# Patient Record
Sex: FEMALE | Race: WHITE | Employment: OTHER | URBAN - METROPOLITAN AREA
[De-identification: names, ages, dates, MRNs, and addresses within clinical notes are randomized per-mention and may not be internally consistent; named-entity substitution may affect disease eponyms.]

---

## 2017-05-24 ENCOUNTER — HOSPITAL ENCOUNTER (OUTPATIENT)
Dept: CT IMAGING | Age: 72
Discharge: HOME | End: 2017-05-24
Attending: INTERNAL MEDICINE
Payer: MEDICARE

## 2017-05-24 DIAGNOSIS — R93.89 ABNORMAL CXR (CHEST X-RAY): ICD-10-CM

## 2017-05-24 DIAGNOSIS — R91.8 LUNG MASS: ICD-10-CM

## 2017-05-24 PROCEDURE — 74011636320 HC RX REV CODE- 636/320: Performed by: RADIOLOGY

## 2017-05-24 PROCEDURE — 71260 CT THORAX DX C+: CPT

## 2017-05-24 RX ADMIN — IOPAMIDOL 80 ML: 612 INJECTION, SOLUTION INTRAVENOUS at 15:06

## 2018-03-07 ENCOUNTER — HOSPITAL ENCOUNTER (OUTPATIENT)
Dept: CT IMAGING | Age: 73
Discharge: HOME | End: 2018-03-07
Attending: INTERNAL MEDICINE
Payer: MEDICARE

## 2018-03-07 DIAGNOSIS — R91.1 LUNG NODULE: ICD-10-CM

## 2018-03-07 PROCEDURE — 71250 CT THORAX DX C-: CPT

## 2018-03-15 RX ORDER — SODIUM CHLORIDE 9 MG/ML
20 INJECTION, SOLUTION INTRAVENOUS CONTINUOUS
Status: CANCELLED | OUTPATIENT
Start: 2018-03-15

## 2018-03-19 ENCOUNTER — HOSPITAL ENCOUNTER (OUTPATIENT)
Dept: NUCLEAR MEDICINE | Age: 73
Discharge: HOME | End: 2018-03-19
Attending: INTERNAL MEDICINE
Payer: MEDICARE

## 2018-03-19 DIAGNOSIS — C50.911 MALIGNANT NEOPLASM OF RIGHT BREAST (HCC): ICD-10-CM

## 2018-03-19 PROCEDURE — 78306 BONE IMAGING WHOLE BODY: CPT

## 2018-03-20 ENCOUNTER — HOSPITAL ENCOUNTER (OUTPATIENT)
Dept: CT IMAGING | Age: 73
Discharge: HOME | End: 2018-03-20
Attending: RADIOLOGY | Admitting: RADIOLOGY
Payer: MEDICARE

## 2018-03-20 VITALS
HEIGHT: 62 IN | HEART RATE: 69 BPM | WEIGHT: 165 LBS | RESPIRATION RATE: 18 BRPM | BODY MASS INDEX: 30.36 KG/M2 | DIASTOLIC BLOOD PRESSURE: 64 MMHG | TEMPERATURE: 99 F | OXYGEN SATURATION: 99 % | SYSTOLIC BLOOD PRESSURE: 111 MMHG

## 2018-03-20 DIAGNOSIS — C50.911 MALIGNANT NEOPLASM OF RIGHT BREAST (HCC): ICD-10-CM

## 2018-03-20 LAB
ANION GAP SERPL CALC-SCNC: 8 MMOL/L (ref 3–18)
APTT PPP: 27.6 SEC (ref 23–36.4)
BUN SERPL-MCNC: 11 MG/DL (ref 7–18)
BUN/CREAT SERPL: 19 (ref 12–20)
CALCIUM SERPL-MCNC: 9 MG/DL (ref 8.5–10.1)
CHLORIDE SERPL-SCNC: 108 MMOL/L (ref 100–108)
CO2 SERPL-SCNC: 23 MMOL/L (ref 21–32)
CREAT SERPL-MCNC: 0.58 MG/DL (ref 0.6–1.3)
ERYTHROCYTE [DISTWIDTH] IN BLOOD BY AUTOMATED COUNT: 13.8 % (ref 11.6–14.5)
GLUCOSE SERPL-MCNC: 98 MG/DL (ref 74–99)
HCT VFR BLD AUTO: 44.8 % (ref 35–45)
HGB BLD-MCNC: 15.3 G/DL (ref 12–16)
INR PPP: 1 (ref 0.8–1.2)
MCH RBC QN AUTO: 31.6 PG (ref 24–34)
MCHC RBC AUTO-ENTMCNC: 34.2 G/DL (ref 31–37)
MCV RBC AUTO: 92.6 FL (ref 74–97)
PLATELET # BLD AUTO: 222 K/UL (ref 135–420)
PMV BLD AUTO: 9.3 FL (ref 9.2–11.8)
POTASSIUM SERPL-SCNC: 4.3 MMOL/L (ref 3.5–5.5)
PROTHROMBIN TIME: 12.7 SEC (ref 11.5–15.2)
RBC # BLD AUTO: 4.84 M/UL (ref 4.2–5.3)
SODIUM SERPL-SCNC: 139 MMOL/L (ref 136–145)
WBC # BLD AUTO: 7.6 K/UL (ref 4.6–13.2)

## 2018-03-20 PROCEDURE — 20225 BONE BIOPSY TROCAR/NDL DEEP: CPT

## 2018-03-20 PROCEDURE — 88342 IMHCHEM/IMCYTCHM 1ST ANTB: CPT | Performed by: RADIOLOGY

## 2018-03-20 PROCEDURE — 88360 TUMOR IMMUNOHISTOCHEM/MANUAL: CPT | Performed by: RADIOLOGY

## 2018-03-20 PROCEDURE — 74011250636 HC RX REV CODE- 250/636: Performed by: RADIOLOGY

## 2018-03-20 PROCEDURE — 80048 BASIC METABOLIC PNL TOTAL CA: CPT | Performed by: RADIOLOGY

## 2018-03-20 PROCEDURE — 88333 PATH CONSLTJ SURG CYTO XM 1: CPT | Performed by: RADIOLOGY

## 2018-03-20 PROCEDURE — 77030003503 CT BX GUIDED NDL

## 2018-03-20 PROCEDURE — 85730 THROMBOPLASTIN TIME PARTIAL: CPT | Performed by: RADIOLOGY

## 2018-03-20 PROCEDURE — 74011000250 HC RX REV CODE- 250: Performed by: RADIOLOGY

## 2018-03-20 PROCEDURE — 85027 COMPLETE CBC AUTOMATED: CPT | Performed by: RADIOLOGY

## 2018-03-20 PROCEDURE — 88341 IMHCHEM/IMCYTCHM EA ADD ANTB: CPT | Performed by: RADIOLOGY

## 2018-03-20 PROCEDURE — 88307 TISSUE EXAM BY PATHOLOGIST: CPT | Performed by: RADIOLOGY

## 2018-03-20 PROCEDURE — 85610 PROTHROMBIN TIME: CPT | Performed by: RADIOLOGY

## 2018-03-20 RX ORDER — LIDOCAINE HYDROCHLORIDE 10 MG/ML
1 INJECTION, SOLUTION EPIDURAL; INFILTRATION; INTRACAUDAL; PERINEURAL
Status: COMPLETED | OUTPATIENT
Start: 2018-03-20 | End: 2018-03-20

## 2018-03-20 RX ORDER — MIDAZOLAM HYDROCHLORIDE 1 MG/ML
.5-4 INJECTION, SOLUTION INTRAMUSCULAR; INTRAVENOUS
Status: DISCONTINUED | OUTPATIENT
Start: 2018-03-20 | End: 2018-03-20 | Stop reason: HOSPADM

## 2018-03-20 RX ORDER — SODIUM CHLORIDE 9 MG/ML
20 INJECTION, SOLUTION INTRAVENOUS CONTINUOUS
Status: DISCONTINUED | OUTPATIENT
Start: 2018-03-20 | End: 2018-03-20 | Stop reason: HOSPADM

## 2018-03-20 RX ORDER — FENTANYL CITRATE 50 UG/ML
25-200 INJECTION, SOLUTION INTRAMUSCULAR; INTRAVENOUS
Status: DISCONTINUED | OUTPATIENT
Start: 2018-03-20 | End: 2018-03-20 | Stop reason: HOSPADM

## 2018-03-20 RX ORDER — ACETAMINOPHEN 325 MG/1
TABLET ORAL
COMMUNITY

## 2018-03-20 RX ORDER — ALPRAZOLAM 0.5 MG/1
TABLET ORAL
COMMUNITY

## 2018-03-20 RX ORDER — OLMESARTAN MEDOXOMIL 20 MG/1
20 TABLET ORAL DAILY
COMMUNITY

## 2018-03-20 RX ADMIN — SODIUM BICARBONATE 1 ML: 0.2 INJECTION, SOLUTION INTRAVENOUS at 10:14

## 2018-03-20 RX ADMIN — MIDAZOLAM HYDROCHLORIDE 0.5 MG: 1 INJECTION, SOLUTION INTRAMUSCULAR; INTRAVENOUS at 10:18

## 2018-03-20 RX ADMIN — FENTANYL CITRATE 25 MCG: 50 INJECTION, SOLUTION INTRAMUSCULAR; INTRAVENOUS at 10:18

## 2018-03-20 RX ADMIN — FENTANYL CITRATE 25 MCG: 50 INJECTION, SOLUTION INTRAMUSCULAR; INTRAVENOUS at 10:15

## 2018-03-20 RX ADMIN — FENTANYL CITRATE 50 MCG: 50 INJECTION, SOLUTION INTRAMUSCULAR; INTRAVENOUS at 09:54

## 2018-03-20 RX ADMIN — MIDAZOLAM HYDROCHLORIDE 1 MG: 1 INJECTION, SOLUTION INTRAMUSCULAR; INTRAVENOUS at 09:55

## 2018-03-20 RX ADMIN — LIDOCAINE HYDROCHLORIDE 10 ML: 10 INJECTION, SOLUTION EPIDURAL; INFILTRATION; INTRACAUDAL; PERINEURAL at 10:14

## 2018-03-20 NOTE — DISCHARGE INSTRUCTIONS
DISCHARGE SUMMARY from Nurse    PATIENT INSTRUCTIONS:    After general anesthesia or intravenous sedation, for 24 hours or while taking prescription Narcotics:  · Limit your activities  · Do not drive and operate hazardous machinery  · Do not make important personal or business decisions  · Do  not drink alcoholic beverages  · If you have not urinated within 8 hours after discharge, please contact your surgeon on call. Report the following to your surgeon:  · Excessive pain, swelling, redness or odor of or around the surgical area  · Temperature over 100.5  · Nausea and vomiting lasting longer than 4 hours or if unable to take medications  · Any signs of decreased circulation or nerve impairment to extremity: change in color, persistent  numbness, tingling, coldness or increase pain  · Any questions    What to do at Home:  Recommended activity: rest today    If you experience any of the following symptoms fever, increased pain, please follow up with PCP as instructed    *  Please give a list of your current medications to your Primary Care Provider. *  Please update this list whenever your medications are discontinued, doses are      changed, or new medications (including over-the-counter products) are added. *  Please carry medication information at all times in case of emergency situations. These are general instructions for a healthy lifestyle:    No smoking/ No tobacco products/ Avoid exposure to second hand smoke  Surgeon General's Warning:  Quitting smoking now greatly reduces serious risk to your health.     Obesity, smoking, and sedentary lifestyle greatly increases your risk for illness    A healthy diet, regular physical exercise & weight monitoring are important for maintaining a healthy lifestyle    You may be retaining fluid if you have a history of heart failure or if you experience any of the following symptoms:  Weight gain of 3 pounds or more overnight or 5 pounds in a week, increased swelling in our hands or feet or shortness of breath while lying flat in bed. Please call your doctor as soon as you notice any of these symptoms; do not wait until your next office visit. Recognize signs and symptoms of STROKE:    F-face looks uneven    A-arms unable to move or move unevenly    S-speech slurred or non-existent    T-time-call 911 as soon as signs and symptoms begin-DO NOT go       Back to bed or wait to see if you get better-TIME IS BRAIN. Warning Signs of HEART ATTACK     Call 911 if you have these symptoms:   Chest discomfort. Most heart attacks involve discomfort in the center of the chest that lasts more than a few minutes, or that goes away and comes back. It can feel like uncomfortable pressure, squeezing, fullness, or pain.  Discomfort in other areas of the upper body. Symptoms can include pain or discomfort in one or both arms, the back, neck, jaw, or stomach.  Shortness of breath with or without chest discomfort.  Other signs may include breaking out in a cold sweat, nausea, or lightheadedness. Don't wait more than five minutes to call 911 - MINUTES MATTER! Fast action can save your life. Calling 911 is almost always the fastest way to get lifesaving treatment. Emergency Medical Services staff can begin treatment when they arrive -- up to an hour sooner than if someone gets to the hospital by car. The discharge information has been reviewed with the patient. The patient verbalized understanding. Discharge medications reviewed with the patient and appropriate educational materials and side effects teaching were provided. ________________________________      Patient armband removed and given to patient to take home.   Patient was informed of the privacy risks if armband lost or stolen_______________________________________________________________________________________________

## 2018-03-20 NOTE — PROGRESS NOTES
Pt arrived via stretcher for scheduled CT guided bx of thoracic T-8 Lytic lesion. Pt alert and oriented x 4. Skin warm and dry. Questions asked and answered appropriately. Lung Sounds WNL. Pt denies pain.    Moved to CT table in prone position, safety strap in place

## 2018-03-20 NOTE — PROGRESS NOTES
4x4 and tegaderm to cover incision site    TRANSFER - OUT REPORT:    Verbal report given to Chioma Rucker RN(name) on Saurabh Mendes  being transferred to [facility](unit) for routine progression of care       Report consisted of patients Situation, Background, Assessment and   Recommendations(SBAR). Information from the following report(s) SBAR, Procedure Summary, Intake/Output and MAR was reviewed with the receiving nurse. Lines:   Peripheral IV 03/20/18 Left Hand (Active)   Site Assessment Clean, dry, & intact 3/20/2018  8:09 AM   Phlebitis Assessment 0 3/20/2018  8:09 AM   Infiltration Assessment 0 3/20/2018  8:09 AM   Dressing Status Clean, dry, & intact 3/20/2018  8:09 AM   Dressing Type Transparent;Tape 3/20/2018  8:09 AM   Hub Color/Line Status Pink; Infusing 3/20/2018  8:09 AM        Opportunity for questions and clarification was provided.       Patient transported with:   [employer]

## 2018-03-20 NOTE — H&P
VASCULAR & INTERVENTIONAL RADIOLOGY PROGRESS NOTE    History and Physical by Dr. Munira Carranza reviewed; I have examined the patient and there are no pertinent changes. Pt is an appropriate candidate to undergo T8-9 bx under moderate sedation.     Updated Physical Exam:  Lungs: CTA  Heart: RRR, without murmur    Harley Johnson MD, MD  Vascular & Interventional Radiology  [facility]  3/20/2018

## 2018-03-20 NOTE — IP AVS SNAPSHOT
[facility]       [address] [place] [address]  [phone]     Patient: Zarina Guaman  MRN: [id number]  :1945           About your hospitalization     You were admitted on:  2018 You last received care in the:  [facility] 67 were discharged on:  2018       Why you were hospitalized     Your primary diagnosis was:  Not on File      Follow-up Information     Follow up With Details Khari Stern MD   [address] [place] [address]  [phone]        Discharge Orders     None       A check tracie indicates which time of day the medication should be taken. My Medications      CONTINUE taking these medications        Instructions Each Dose to Equal    Morning Noon Evening Bedtime    aspirin 81 mg tablet       Your last dose was: Your next dose is: Take 81 mg by mouth daily. 81 mg                        BENICAR 20 mg tablet   Generic drug:  olmesartan       Your last dose was: Your next dose is: Take 20 mg by mouth daily. 20 mg                        calcium carbonate 600 mg calcium (1,500 mg) tablet   Commonly known as:  CALTREX       Your last dose was: Your next dose is: Take 600 mg by mouth two (2) times a day. 600 mg                        cholecalciferol (vitamin D3) 2,000 unit Tab       Your last dose was: Your next dose is: Take 1 Tab by mouth. 1 Tab                        Magnesium Oxide 500 mg Cap       Your last dose was: Your next dose is: Take 1 Tab by mouth. 1 Tab                        multivitamin tablet   Commonly known as:  ONE A DAY       Your last dose was: Your next dose is: Take 1 Tab by mouth daily. 1 Tab                        omega 3-DHA-EPA-fish oil 1,000 mg (120 mg-180 mg) capsule       Your last dose was:          Your next dose is: Take 1 Tab by mouth two (2) times a day. 1 Tab                        TYLENOL 325 mg tablet   Generic drug:  acetaminophen       Your last dose was: Your next dose is: Take  by mouth every four (4) hours as needed for Pain. XANAX 0.5 mg tablet   Generic drug:  ALPRAZolam       Your last dose was: Your next dose is: Take  by mouth. Discharge Instructions         DISCHARGE SUMMARY from Nurse    PATIENT INSTRUCTIONS:    After general anesthesia or intravenous sedation, for 24 hours or while taking prescription Narcotics:  · Limit your activities  · Do not drive and operate hazardous machinery  · Do not make important personal or business decisions  · Do  not drink alcoholic beverages  · If you have not urinated within 8 hours after discharge, please contact your surgeon on call. Report the following to your surgeon:  · Excessive pain, swelling, redness or odor of or around the surgical area  · Temperature over 100.5  · Nausea and vomiting lasting longer than 4 hours or if unable to take medications  · Any signs of decreased circulation or nerve impairment to extremity: change in color, persistent  numbness, tingling, coldness or increase pain  · Any questions    What to do at Home:  Recommended activity: rest today    If you experience any of the following symptoms fever, increased pain, please follow up with PCP as instructed    *  Please give a list of your current medications to your Primary Care Provider. *  Please update this list whenever your medications are discontinued, doses are      changed, or new medications (including over-the-counter products) are added. *  Please carry medication information at all times in case of emergency situations.     These are general instructions for a healthy lifestyle:    No smoking/ No tobacco products/ Avoid exposure to second hand smoke  Surgeon Lindsay Roberto Warning:  Quitting smoking now greatly reduces serious risk to your health. Obesity, smoking, and sedentary lifestyle greatly increases your risk for illness    A healthy diet, regular physical exercise & weight monitoring are important for maintaining a healthy lifestyle    You may be retaining fluid if you have a history of heart failure or if you experience any of the following symptoms:  Weight gain of 3 pounds or more overnight or 5 pounds in a week, increased swelling in our hands or feet or shortness of breath while lying flat in bed. Please call your doctor as soon as you notice any of these symptoms; do not wait until your next office visit. Recognize signs and symptoms of STROKE:    F-face looks uneven    A-arms unable to move or move unevenly    S-speech slurred or non-existent    T-time-call 911 as soon as signs and symptoms begin-DO NOT go       Back to bed or wait to see if you get better-TIME IS BRAIN. Warning Signs of HEART ATTACK     Call 911 if you have these symptoms:   Chest discomfort. Most heart attacks involve discomfort in the center of the chest that lasts more than a few minutes, or that goes away and comes back. It can feel like uncomfortable pressure, squeezing, fullness, or pain.  Discomfort in other areas of the upper body. Symptoms can include pain or discomfort in one or both arms, the back, neck, jaw, or stomach.  Shortness of breath with or without chest discomfort.  Other signs may include breaking out in a cold sweat, nausea, or lightheadedness. Don't wait more than five minutes to call [address]! Fast action can save your life. Calling 911 is almost always the fastest way to get lifesaving treatment. Emergency Medical Services staff can begin treatment when they arrive  up to an hour sooner than if someone gets to the hospital by car. The discharge information has been reviewed with the patient. The patient verbalized understanding.   Discharge medications reviewed with the patient and appropriate educational materials and side effects teaching were provided. ________________________________      Patient armband removed and given to patient to take home. Patient was informed of the privacy risks if armband lost or stolen_______________________________________________________________________________________________             Introducing [facility]! Dear Giovany Morales: Thank you for requesting a [email or web address] account. Our records indicate that you already have an active [email or web address] account. You can access your account anytime at [email or web address]     Did you know that you can access your hospital and ER discharge instructions at any time in [email or web address]? You can also review all of your test results from your hospital stay or ER visit. Additional Information    If you have questions, please visit the Frequently Asked Questions section of the [email or web address] website at [email or web address] Remember, [email or web address] is NOT to be used for urgent needs. For medical emergencies, dial 911. Now available from your iPhone and Android!         Unresulted Labs-Please follow up with your PCP about these lab tests     Order Current Status    CT BX GUIDED NDL In process      Providers Seen During Your Hospitalization     Provider Specialty Primary office phone    Jen Boykin MD Radiology [phone]      Your Primary Care Physician (PCP)     Primary Care Physician Office Phone Office Fax    BLACK, Reyna [facility] [address] [phone]      You are allergic to the following     Allergen Reactions    Codeine Nausea Only         Morphine Nausea Only      Recent Documentation     Height Weight BMI OB Status Smoking Status       1.575 m 74.8 kg 30.18 kg/m2 Postmenopausal Former Smoker          Emergency Contacts       Name Discharge Info Relation Home Work Mobile    Brady 79 A DISCHARGE CAREGIVER [3] Friend [5] [phone] Patient Belongings     The following personal items are in your possession at time of discharge:    Dental Appliances: None  Visual Aid: Glasses      Home Medications: None   Jewelry: None  Clothing: Shirt, Socks, Footwear, Undergarments, Pants    Other Valuables: None              Please provide this summary of care documentation to your next provider. Signatures-by signing, you are acknowledging that this After Visit Summary has been reviewed with you and you have received a copy.                       Patient Signature:  ____________________________________________________________    Date:  ____________________________________________________________      `           Provider Signature:  ____________________________________________________________    Date:  ____________________________________________________________

## 2019-09-27 NOTE — PERIOP NOTES
PAT - SURGICAL PRE-ADMISSION INSTRUCTIONS    NAME:  Dick Myers                                                          TODAY'S DATE:  9/27/2019    SURGERY DATE:  10/1/2019                                  SURGERY ARRIVAL TIME:   0930    1. Do NOT eat or drink anything, including candy or gum, after MIDNIGHT on 9/30/19 , unless you have specific instructions from your Surgeon or Anesthesia Provider to do so. 2. No smoking on the day of surgery. 3. No alcohol 24 hours prior to the day of surgery. 4. No recreational drugs for one week prior to the day of surgery. 5. Leave all valuables, including money/purse, at home. 6. Remove all jewelry, nail polish, makeup (including mascara); no lotions, powders, deodorant, or perfume/cologne/after shave. 7. Glasses/Contact lenses and Dentures may be worn to the hospital.  They will be removed prior to surgery. 8. Call your doctor if symptoms of a cold or illness develop within 24 ours prior to surgery. 9. AN ADULT MUST DRIVE YOU HOME AFTER OUTPATIENT SURGERY. 10. If you are having an OUTPATIENT procedure, please make arrangements for a responsible adult to be with you for 24 hours after your surgery. 11. If you are admitted to the hospital, you will be assigned to a bed after surgery is complete. Normally a family member will not be able to see you until you are in your assigned bed. 15. Family is encouraged to accompany you to the hospital.  We ask visitors in the treatment area to be limited to ONE person at a time to ensure patient privacy. EXCEPTIONS WILL BE MADE AS NEEDED. 15. Children under 12 are discouraged from entering the treatment area and need to be supervised by an adult when in the waiting room. Special Instructions: Take these medications the morning of surgery with a sip of water:  MORNING MEDS, Complete bowel prep per MD instructions.     Patient Prep:    shower with anti-bacterial soap    These surgical instructions were reviewed with PATIENT during the PAT PHONE CALL. Directions: On the morning of surgery, please go to the [facility]. Enter the building from the [facility] entrance, 1st floor (next to the Emergency Room entrance). Take the elevator to the 2nd floor. Sign in at the Registration Desk.     If you have any questions and/or concerns, please do not hesitate to call:  (Prior to the day of surgery)  [facility] unit:  [phone]  (Day of surgery)  [facility] unit:  [phone]

## 2019-09-30 ENCOUNTER — ANESTHESIA EVENT (OUTPATIENT)
Dept: ENDOSCOPY | Age: 74
End: 2019-09-30
Payer: MEDICARE

## 2019-10-01 ENCOUNTER — HOSPITAL ENCOUNTER (OUTPATIENT)
Age: 74
Setting detail: OUTPATIENT SURGERY
Discharge: HOME | End: 2019-10-01
Attending: INTERNAL MEDICINE | Admitting: INTERNAL MEDICINE
Payer: MEDICARE

## 2019-10-01 ENCOUNTER — ANESTHESIA (OUTPATIENT)
Dept: ENDOSCOPY | Age: 74
End: 2019-10-01
Payer: MEDICARE

## 2019-10-01 VITALS
TEMPERATURE: 97.3 F | SYSTOLIC BLOOD PRESSURE: 104 MMHG | RESPIRATION RATE: 16 BRPM | HEIGHT: 63 IN | DIASTOLIC BLOOD PRESSURE: 53 MMHG | HEART RATE: 66 BPM | BODY MASS INDEX: 30.55 KG/M2 | OXYGEN SATURATION: 100 % | WEIGHT: 172.44 LBS

## 2019-10-01 LAB — GLUCOSE BLD STRIP.AUTO-MCNC: 103 MG/DL (ref 70–110)

## 2019-10-01 PROCEDURE — 77030031670 HC DEV INFL ENDOTEK BIG60 MRTM -B: Performed by: INTERNAL MEDICINE

## 2019-10-01 PROCEDURE — 74011250636 HC RX REV CODE- 250/636: Performed by: NURSE ANESTHETIST, CERTIFIED REGISTERED

## 2019-10-01 PROCEDURE — 76040000019: Performed by: INTERNAL MEDICINE

## 2019-10-01 PROCEDURE — 74011250636 HC RX REV CODE- 250/636

## 2019-10-01 PROCEDURE — 82962 GLUCOSE BLOOD TEST: CPT

## 2019-10-01 PROCEDURE — 74011000250 HC RX REV CODE- 250

## 2019-10-01 PROCEDURE — 76060000031 HC ANESTHESIA FIRST 0.5 HR: Performed by: INTERNAL MEDICINE

## 2019-10-01 RX ORDER — SODIUM CHLORIDE 0.9 % (FLUSH) 0.9 %
5-40 SYRINGE (ML) INJECTION AS NEEDED
Status: DISCONTINUED | OUTPATIENT
Start: 2019-10-01 | End: 2019-10-01 | Stop reason: HOSPADM

## 2019-10-01 RX ORDER — SODIUM CHLORIDE, SODIUM LACTATE, POTASSIUM CHLORIDE, CALCIUM CHLORIDE 600; 310; 30; 20 MG/100ML; MG/100ML; MG/100ML; MG/100ML
25 INJECTION, SOLUTION INTRAVENOUS CONTINUOUS
Status: CANCELLED | OUTPATIENT
Start: 2019-10-01

## 2019-10-01 RX ORDER — SODIUM CHLORIDE, SODIUM LACTATE, POTASSIUM CHLORIDE, CALCIUM CHLORIDE 600; 310; 30; 20 MG/100ML; MG/100ML; MG/100ML; MG/100ML
50 INJECTION, SOLUTION INTRAVENOUS CONTINUOUS
Status: DISCONTINUED | OUTPATIENT
Start: 2019-10-01 | End: 2019-10-01 | Stop reason: HOSPADM

## 2019-10-01 RX ORDER — FAMOTIDINE 20 MG/1
20 TABLET, FILM COATED ORAL ONCE
Status: DISCONTINUED | OUTPATIENT
Start: 2019-10-01 | End: 2019-10-01 | Stop reason: HOSPADM

## 2019-10-01 RX ORDER — DEXTROMETHORPHAN/PSEUDOEPHED 2.5-7.5/.8
1.2 DROPS ORAL
Status: DISCONTINUED | OUTPATIENT
Start: 2019-10-01 | End: 2019-10-01 | Stop reason: HOSPADM

## 2019-10-01 RX ORDER — PROPOFOL 10 MG/ML
INJECTION, EMULSION INTRAVENOUS AS NEEDED
Status: DISCONTINUED | OUTPATIENT
Start: 2019-10-01 | End: 2019-10-01 | Stop reason: HOSPADM

## 2019-10-01 RX ORDER — SODIUM CHLORIDE 0.9 % (FLUSH) 0.9 %
5-40 SYRINGE (ML) INJECTION EVERY 8 HOURS
Status: DISCONTINUED | OUTPATIENT
Start: 2019-10-01 | End: 2019-10-01 | Stop reason: HOSPADM

## 2019-10-01 RX ORDER — LIDOCAINE HYDROCHLORIDE 20 MG/ML
INJECTION, SOLUTION EPIDURAL; INFILTRATION; INTRACAUDAL; PERINEURAL AS NEEDED
Status: DISCONTINUED | OUTPATIENT
Start: 2019-10-01 | End: 2019-10-01 | Stop reason: HOSPADM

## 2019-10-01 RX ORDER — ONDANSETRON 2 MG/ML
4 INJECTION INTRAMUSCULAR; INTRAVENOUS ONCE
Status: CANCELLED | OUTPATIENT
Start: 2019-10-01 | End: 2019-10-01

## 2019-10-01 RX ORDER — INSULIN LISPRO 100 [IU]/ML
INJECTION, SOLUTION INTRAVENOUS; SUBCUTANEOUS ONCE
Status: DISCONTINUED | OUTPATIENT
Start: 2019-10-01 | End: 2019-10-01 | Stop reason: HOSPADM

## 2019-10-01 RX ADMIN — PROPOFOL 50 MG: 10 INJECTION, EMULSION INTRAVENOUS at 11:13

## 2019-10-01 RX ADMIN — PROPOFOL 50 MG: 10 INJECTION, EMULSION INTRAVENOUS at 11:10

## 2019-10-01 RX ADMIN — PROPOFOL 10 MG: 10 INJECTION, EMULSION INTRAVENOUS at 11:24

## 2019-10-01 RX ADMIN — PROPOFOL 50 MG: 10 INJECTION, EMULSION INTRAVENOUS at 11:21

## 2019-10-01 RX ADMIN — LIDOCAINE HYDROCHLORIDE 50 MG: 20 INJECTION, SOLUTION EPIDURAL; INFILTRATION; INTRACAUDAL; PERINEURAL at 11:09

## 2019-10-01 RX ADMIN — SODIUM CHLORIDE, SODIUM LACTATE, POTASSIUM CHLORIDE, AND CALCIUM CHLORIDE 50 ML/HR: 600; 310; 30; 20 INJECTION, SOLUTION INTRAVENOUS at 10:49

## 2019-10-01 RX ADMIN — PROPOFOL 50 MG: 10 INJECTION, EMULSION INTRAVENOUS at 11:09

## 2019-10-01 NOTE — H&P
History and Physical    Ary Murphy  1945  [id number]  [id number]    Assessment:  Colon polyp surveillance    Treatment/Plan:  Colonoscopy    History of present illness:  68 y.o. female here for surveillance colonoscopy bec of her hx of adenomatous colon polyp. ROS: No chest pain, shortness of breath, headaches, dizziness, lightheadedness, nausea, vomiting or abdominal pain. Evaluation of past illnesses, surgeries, or injuries:  yes    Past Medical History:   Diagnosis Date    Adverse effect of anesthesia     difficult to wake    Cancer ([address]     RIGHT SIDE, LYPHM NODES    Diabetes ([facility] [address]     cpap, pt denies     Hypertension     Nausea & vomiting     Psychiatric disorder     Stroke [facility])     eye stroke       Past Surgical History:   Procedure Laterality Date    COLONOSCOPY  2013         HX BREAST LUMPECTOMY Right     HX CATARACT REMOVAL Right 2017    HX LITHOTRIPSY      HX PELVIC LAPAROSCOPY      ovarian cyst removed       Allergies: Allergies   Allergen Reactions    Codeine Nausea Only    Morphine Nausea Only       Previous reactions to sedation/analgesia? no     Review of current medications, supplement, herbals and nutraceuticals complete:  yes    Pertinent labs reviewed? yes    History of active substance abuse?  no    History reviewed. No pertinent family history.     Social History     Socioeconomic History    Marital status:      Spouse name: Not on file    Number of children: Not on file    Years of education: Not on file    Highest education level: Not on file   Occupational History    Not on file   Social Needs    Financial resource strain: Not on file    Food insecurity:     Worry: Not on file     Inability: Not on file    Transportation needs:     Medical: Not on file     Non-medical: Not on file   Tobacco Use    Smoking status: Former Smoker     Last attempt to quit: 2017     Years since quittin.1    Smokeless tobacco: Never Used   Substance and Sexual Activity    Alcohol use: Yes     Alcohol/week: 1.0 standard drinks     Types: 1 Glasses of wine per week     Comment: soc.  Drug use: No    Sexual activity: Not on file   Lifestyle    Physical activity:     Days per week: Not on file     Minutes per session: Not on file    Stress: Not on file   Relationships    Social connections:     Talks on phone: Not on file     Gets together: Not on file     Attends Worship service: Not on file     Active member of club or organization: Not on file     Attends meetings of clubs or organizations: Not on file     Relationship status: Not on file    Intimate partner violence:     Fear of current or ex partner: Not on file     Emotionally abused: Not on file     Physically abused: Not on file     Forced sexual activity: Not on file   Other Topics Concern    Not on file   Social History Narrative    Not on file       Examination:  Cardiac Status:  WNL    Mental Status:  WNL     Pulmonary Status:  WNL    NPO:  9-12    Juan Mayfield MD, [address]  10/1/2019  10:50 AM

## 2019-10-01 NOTE — ADDENDUM NOTE
Addendum  created 10/01/19 1302 by Elvin Ashley DO    Attestation recorded in [employer], [employer] filed

## 2019-10-01 NOTE — ANESTHESIA PREPROCEDURE EVALUATION
Relevant Problems   No relevant active problems       Anesthetic History     PONV          Review of Systems / Medical History  Patient summary reviewed, nursing notes reviewed and pertinent labs reviewed    Pulmonary  Within defined limits                 Neuro/Psych       CVA  TIA and psychiatric history     Cardiovascular    Hypertension                   GI/Hepatic/Renal  Within defined limits              Endo/Other    Diabetes         Other Findings              Physical Exam    Airway  Mallampati: III  TM Distance: 4 - 6 cm  Neck ROM: normal range of motion   Mouth opening: Normal     Cardiovascular  Regular rate and rhythm,  S1 and S2 normal,  no murmur, click, rub, or gallop             Dental    Dentition: Poor dentition     Pulmonary  Breath sounds clear to auscultation               Abdominal  GI exam deferred       Other Findings            Anesthetic Plan    ASA: 3  Anesthesia type: MAC          Induction: Intravenous  Anesthetic plan and risks discussed with: Patient

## 2019-10-01 NOTE — DISCHARGE INSTRUCTIONS
For the next 12 hours you should not:   1. drive   2. drink alcohol   3. operate any machinery   4. engage in activities that require mental sharpness or manual dexterity    5. take any drugs other than those prescribed by a physician   6. make any legal or financial decisions    Call your doctor's office immediately, if:   1. Severe rectal bleeding   2. High fever   3. Severe abdominal pain    Take it easy today and resume normal activity tomorrow. Resume previous diet. Juan Mayfield MD, [address]        Patient armband removed and given to patient to take home. Patient was informed of the privacy risks if armband lost or stolen      DISCHARGE SUMMARY from Nurse    PATIENT INSTRUCTIONS:    After general anesthesia or intravenous sedation, for 24 hours or while taking prescription Narcotics:  · Limit your activities  · Do not drive and operate hazardous machinery  · Do not make important personal or business decisions  · Do  not drink alcoholic beverages  · If you have not urinated within 8 hours after discharge, please contact your surgeon on call. Report the following to your surgeon:  · Excessive pain, swelling, redness or odor of or around the surgical area  · Temperature over 100.5  · Nausea and vomiting lasting longer than 4 hours or if unable to take medications  · Any signs of decreased circulation or nerve impairment to extremity: change in color, persistent  numbness, tingling, coldness or increase pain  · Any questions    What to do at Home:  Recommended activity: Activity as tolerated and no driving for today,       *  Please give a list of your current medications to your Primary Care Provider. *  Please update this list whenever your medications are discontinued, doses are      changed, or new medications (including over-the-counter products) are added. *  Please carry medication information at all times in case of emergency situations.     These are general instructions for a healthy lifestyle:    No smoking/ No tobacco products/ Avoid exposure to second hand smoke  Surgeon General's Warning:  Quitting smoking now greatly reduces serious risk to your health. Obesity, smoking, and sedentary lifestyle greatly increases your risk for illness    A healthy diet, regular physical exercise & weight monitoring are important for maintaining a healthy lifestyle    You may be retaining fluid if you have a history of heart failure or if you experience any of the following symptoms:  Weight gain of 3 pounds or more overnight or 5 pounds in a week, increased swelling in our hands or feet or shortness of breath while lying flat in bed. Please call your doctor as soon as you notice any of these symptoms; do not wait until your next office visit. The discharge information has been reviewed with the patient. The patient verbalized understanding. Discharge medications reviewed with the patient and appropriate educational materials and side effects teaching were provided.   ___________________________________________________________________________________________________________________________________

## 2019-10-01 NOTE — PROCEDURES
Colonoscopy Report     Date of Procedure:10/1/2019       Patient: Ary Murphy  Date of Birth: @     MRN: [id number]   Age: 68 y.o.   SSN: [id number]  Sex: female            FINDINGS & IMPRESSION:  1. Moderate degree diverticulosis in the sigmoid and descending colon with tortuous distal anatomy. 2. No polyps or mass. 3. Normal terminal ileum x 10 cm. 4. Small hemorrhoids. RECOMMENDATIONS  1. Resume all home medications and diet. 2. Screening colonoscopy in 10 years is no longer recommended per ACP and USPSTF guidelines unless patient believes that continued screening is in her best interest and provided she has a reasonable 10 year life expectancy with no medical contraindications. 3. She will return to her PCP and follow-up with me prn. Indication:  Personal history of colon polyps (screening only)  Procedure Performed: Colonoscopy   Endoscopist: Shara Camacho MD  Assistant: Endoscopy Technician-1: Nahum Lorenz CNA  Endoscopy RN-1: Arsalan Fry RN  ASA: ASA 3 - Patient with moderate systemic disease with functional limitations  Mallampati Score: II (soft palate, uvula, fauces visible)  Anesthesia: MAC anesthesia  Endoscope: [id number]  Extent of Examination:cecum, which was identified by the ileocecal valve and appendiceal orifice, terminal ileum  Quality of Preparation:     [x]  Excellent   []  Very Good   [] Fair but adequate   [] Fair, inadequate   []  Poor      Technique: Informed consent was obtained. A safety timeout was performed. The patient was placed in left lateral position. A perianal inspection and a digital rectal exam were performed. The patients vital signs were monitored at all times including heart rate, rhythm, blood pressure and oxygen saturation. Sedation/anesthesia was administered.   When adequate sedation was achieved the video colonoscope was introduced into the rectum and advanced under direct vision up to the cecum, which was identified by the ileocecal valve and appendiceal orifice. The terminal ileum was intubated. With adequate insufflation and maneuvering of the withdrawing scope, the colonic mucosa was visualized carefully. Retroflexion was performed in the rectum and the distal rectum visualized. The patient tolerated the procedure very well and was transferred to recovery area. EBL:  None    Complications:  None. Specimen: None    Prosthetic devices or implants:  None. Juan Oliveira MD, [address]  [facility]  October 1, 2019  11:28 AM

## 2019-10-01 NOTE — PERIOP NOTES
Pre-Op Summary    Pt arrived via car with family/friend and is oriented to time, place, person and situation. Patient with steady gait with none assistive devices. Visit Vitals  /78   Pulse 69   Temp 97.3 °F (36.3 °C)   Resp 14   Ht 5' 2.5\" (1.588 m)   Wt 78.2 kg (172 lb 7 oz)   SpO2 95%   BMI 31.04 kg/m²       Peripheral IV located on Left hand . Patients belongings are located ex . Patient's point of contact is Juan A Manzo. They will be in the waiting room. They are able to receive medication information. They will be their ride home.

## 2019-10-01 NOTE — ANESTHESIA POSTPROCEDURE EVALUATION
Procedure(s):  COLONOSCOPY. MAC    Anesthesia Post Evaluation      Multimodal analgesia: multimodal analgesia used between 6 hours prior to anesthesia start to PACU discharge  Patient location: [facility].   Patient participation: complete - patient participated  Level of consciousness: awake  Pain score: 0  Airway patency: patent  Anesthetic complications: no  Cardiovascular status: stable  Respiratory status: room air  Hydration status: stable  Post anesthesia nausea and vomiting:  none      Vitals Value Taken Time   /53 10/1/2019 11:34 AM   Temp     Pulse 68 10/1/2019 11:34 AM   Resp 12 10/1/2019 11:34 AM   SpO2 100 % 10/1/2019 11:34 AM

## 2020-11-11 ENCOUNTER — TRANSCRIBE ORDER (OUTPATIENT)
Dept: SCHEDULING | Age: 75
End: 2020-11-11

## 2020-11-11 DIAGNOSIS — R10.13 EPIGASTRIC ABDOMINAL PAIN: Primary | ICD-10-CM

## 2020-11-12 ENCOUNTER — TRANSCRIBE ORDER (OUTPATIENT)
Dept: SCHEDULING | Age: 75
End: 2020-11-12

## 2020-11-12 DIAGNOSIS — R10.13 EPIGASTRIC ABDOMINAL PAIN: Primary | ICD-10-CM

## 2020-12-07 ENCOUNTER — HOSPITAL ENCOUNTER (OUTPATIENT)
Dept: GENERAL RADIOLOGY | Age: 75
Discharge: HOME | End: 2020-12-07
Attending: INTERNAL MEDICINE
Payer: MEDICARE

## 2020-12-07 DIAGNOSIS — R10.13 EPIGASTRIC ABDOMINAL PAIN: ICD-10-CM

## 2020-12-07 PROCEDURE — 74246 X-RAY XM UPR GI TRC 2CNTRST: CPT

## 2020-12-07 PROCEDURE — 74011000255 HC RX REV CODE- 255: Performed by: INTERNAL MEDICINE

## 2020-12-07 PROCEDURE — 74011000250 HC RX REV CODE- 250: Performed by: INTERNAL MEDICINE

## 2020-12-07 RX ADMIN — BARIUM SULFATE 135 ML: 980 POWDER, FOR SUSPENSION ORAL at 09:42

## 2020-12-07 RX ADMIN — ANTACID/ANTIFLATULENT 4 G: 380; 550; 10; 10 GRANULE, EFFERVESCENT ORAL at 09:41

## 2020-12-07 RX ADMIN — BARIUM SULFATE 176 G: 960 POWDER, FOR SUSPENSION ORAL at 09:42

## 2020-12-18 ENCOUNTER — TRANSCRIBE ORDER (OUTPATIENT)
Dept: SCHEDULING | Age: 75
End: 2020-12-18

## 2020-12-18 DIAGNOSIS — I27.20 PULMONARY HTN (HCC): Primary | ICD-10-CM

## 2021-01-05 ENCOUNTER — HOSPITAL ENCOUNTER (OUTPATIENT)
Dept: NON INVASIVE DIAGNOSTICS | Age: 76
Discharge: HOME | End: 2021-01-05
Attending: INTERNAL MEDICINE
Payer: MEDICARE

## 2021-01-05 VITALS
SYSTOLIC BLOOD PRESSURE: 104 MMHG | HEIGHT: 62 IN | WEIGHT: 172 LBS | BODY MASS INDEX: 31.65 KG/M2 | DIASTOLIC BLOOD PRESSURE: 53 MMHG

## 2021-01-05 DIAGNOSIS — I27.20 PULMONARY HTN (HCC): ICD-10-CM

## 2021-01-05 LAB
ECHO AO ARCH DIAM: 2.35 CM
ECHO AO ASC DIAM: 3.21 CM
ECHO AO ROOT DIAM: 3.06 CM
ECHO IVC PROX: 1.33 CM
ECHO LA MAJOR AXIS: 2.9 CM
ECHO LA MINOR AXIS: 1.62 CM
ECHO LV EDV A2C: 90.43 ML
ECHO LV EDV A4C: 73.31 ML
ECHO LV EDV BP: 81.47 ML (ref 56–104)
ECHO LV EDV INDEX A4C: 40.9 ML/M2
ECHO LV EDV INDEX BP: 45.4 ML/M2
ECHO LV EDV NDEX A2C: 50.4 ML/M2
ECHO LV EJECTION FRACTION A2C: 66 PERCENT
ECHO LV EJECTION FRACTION A4C: 65 PERCENT
ECHO LV EJECTION FRACTION BIPLANE: 64.1 PERCENT (ref 55–100)
ECHO LV ESV A2C: 31.13 ML
ECHO LV ESV A4C: 25.97 ML
ECHO LV ESV BP: 29.27 ML (ref 19–49)
ECHO LV ESV INDEX A2C: 17.4 ML/M2
ECHO LV ESV INDEX A4C: 14.5 ML/M2
ECHO LV ESV INDEX BP: 16.3 ML/M2
ECHO LV INTERNAL DIMENSION DIASTOLIC: 3.87 CM (ref 3.9–5.3)
ECHO LV INTERNAL DIMENSION SYSTOLIC: 2.82 CM
ECHO LV IVSD: 1.44 CM (ref 0.6–0.9)
ECHO LV MASS 2D: 163.4 G (ref 67–162)
ECHO LV MASS INDEX 2D: 91.1 G/M2 (ref 43–95)
ECHO LV POSTERIOR WALL DIASTOLIC: 1.02 CM (ref 0.6–0.9)
ECHO LVOT CARDIAC OUTPUT: 19.9 LITER/MINUTE
ECHO LVOT DIAM: 1.96 CM
ECHO LVOT PEAK GRADIENT: 8.58 MMHG
ECHO LVOT PEAK VELOCITY: 146.42 CM/S
ECHO LVOT SV: 59.3 ML
ECHO LVOT SV: 92 ML
ECHO LVOT VTI: 30.45 CM
ECHO MV A VELOCITY: 138.82 CM/S
ECHO MV E DECELERATION TIME (DT): 269.86 MS
ECHO MV E VELOCITY: 90.04 CM/S
ECHO MV E/A RATIO: 0.65
ECHO PV REGURGITANT MAX VELOCITY: 271.93 CM/S
ECHO TV REGURGITANT MAX VELOCITY: 273 CM/S
ECHO TV REGURGITANT PEAK GRADIENT: 29.9 MMHG
LVOT MG: 5.21 MMHG

## 2021-01-05 PROCEDURE — 93306 TTE W/DOPPLER COMPLETE: CPT

## (undated) DEVICE — MEDI-VAC NON-CONDUCTIVE SUCTION TUBING: Brand: CARDINAL HEALTH

## (undated) DEVICE — BASIN EMESIS 500CC ROSE 250/CS 60/PLT: Brand: MEDEGEN MEDICAL PRODUCTS, LLC

## (undated) DEVICE — SYR 50ML SLIP TIP NSAF LF STRL --

## (undated) DEVICE — KENDALL 500 SERIES DIAPHORETIC FOAM MONITORING ELECTRODE - TEAR DROP SHAPE ( 30/PK): Brand: KENDALL

## (undated) DEVICE — DEVICE INFL 60ML 12ATM CONVENIENT LOK REL HNDL HI PRSS FLX

## (undated) DEVICE — KIT COLON W/ 1.1OZ LUB AND 2 END

## (undated) DEVICE — FLEX ADVANTAGE 1500CC: Brand: FLEX ADVANTAGE